# Patient Record
Sex: MALE | Race: WHITE | Employment: OTHER | URBAN - NONMETROPOLITAN AREA
[De-identification: names, ages, dates, MRNs, and addresses within clinical notes are randomized per-mention and may not be internally consistent; named-entity substitution may affect disease eponyms.]

---

## 2020-06-23 ENCOUNTER — HOSPITAL ENCOUNTER (OUTPATIENT)
Dept: MRI IMAGING | Age: 60
Discharge: HOME | End: 2020-06-23
Payer: COMMERCIAL

## 2020-06-23 LAB — POC CREATININE WHOLE BLOOD: 0.7 MG/DL (ref 0.5–1.2)

## 2020-06-23 PROCEDURE — 6360000004 HC RX CONTRAST MEDICATION: Performed by: NURSE PRACTITIONER

## 2020-06-23 PROCEDURE — 82565 ASSAY OF CREATININE: CPT

## 2020-06-23 PROCEDURE — A9579 GAD-BASE MR CONTRAST NOS,1ML: HCPCS | Performed by: NURSE PRACTITIONER

## 2020-06-23 PROCEDURE — 74183 MRI ABD W/O CNTR FLWD CNTR: CPT

## 2020-06-23 RX ADMIN — GADOTERIDOL 20 ML: 279.3 INJECTION, SOLUTION INTRAVENOUS at 20:32

## 2020-06-24 ENCOUNTER — HOSPITAL ENCOUNTER (OUTPATIENT)
Dept: CT IMAGING | Age: 60
Discharge: HOME | End: 2020-06-24
Payer: COMMERCIAL

## 2020-06-24 PROCEDURE — 3209999900 CT COMPARISON OF OUTSIDE FILMS

## 2020-06-29 ENCOUNTER — TELEPHONE (OUTPATIENT)
Dept: FAMILY MEDICINE CLINIC | Age: 60
End: 2020-06-29

## 2020-06-29 PROBLEM — Z51.5 PALLIATIVE CARE PATIENT: Status: ACTIVE | Noted: 2020-06-29

## 2020-06-29 PROBLEM — C25.9 METASTASIS FROM PANCREATIC CANCER (HCC): Status: ACTIVE | Noted: 2020-06-29

## 2020-06-29 PROBLEM — C79.9 METASTASIS FROM PANCREATIC CANCER (HCC): Status: ACTIVE | Noted: 2020-06-29

## 2020-06-29 RX ORDER — OXYCODONE HYDROCHLORIDE AND ACETAMINOPHEN 5; 325 MG/1; MG/1
2 TABLET ORAL EVERY 4 HOURS PRN
Qty: 80 TABLET | Refills: 0 | Status: SHIPPED | OUTPATIENT
Start: 2020-06-29 | End: 2020-07-06

## 2024-01-10 PROCEDURE — 93010 ELECTROCARDIOGRAM REPORT: CPT | Performed by: INTERNAL MEDICINE

## 2024-10-09 ENCOUNTER — HOSPITAL ENCOUNTER
Age: 64
Discharge: HOME | End: 2024-10-09
Payer: MEDICARE

## 2024-10-09 DIAGNOSIS — M79.672: Primary | ICD-10-CM

## 2024-10-09 PROCEDURE — 73630 X-RAY EXAM OF FOOT: CPT

## 2024-10-09 NOTE — XR_ITS
The [address] [address] 
     [phone] 
  
  
Patient Name: 
JUAQUIN TRAYLOR 
  
MRN: [id number]    YOB: 1960    Sex: M 
Assigned Patient Location: RAD 
Current Patient Location:   
Accession/Order Number: [id number] 
Exam Date: 10/09/2024  10:15    Report Date: 10/10/2024  08:04 
  
At the request of: 
VANNESSA WYATT   
  
Procedure:  XR foot LT min 3V 
  
PROCEDURE: XR foot LT min 3V 
  
HISTORY: LEFT FOOT PAIN ; plantar forefoot pain in region of second, fourth,  
and 5th metatarsals; remote history of fractures 
  
COMPARISON: None. 
  
FINDINGS: 
BONES:Mild degenerative changes the first metatarsophalangeal joint. Short,  
small fourth metatarsal. No fracture, dislocation, bone lesion. Degenerative  
enthesopathic spurring of the calcaneus at the Achilles tendon insertion.  
SOFT TISSUES:No visible soft tissue swelling.  
EFFUSION:None visible.  
OTHER: Negative.  
  
ORDER #: [id number] XR/XR foot LT min 3V  
IMPRESSION:  
   
1. No acute or specific findings to account for patient's symptoms.  
2. Short, small appearance of the fourth metatarsal likely sequela of remote   
trauma.  
   
   
Electronically authenticated by: CLAY DELONG   Date: 10/10/2024  08:04